# Patient Record
Sex: FEMALE | Race: ASIAN | NOT HISPANIC OR LATINO | Employment: FULL TIME
[De-identification: names, ages, dates, MRNs, and addresses within clinical notes are randomized per-mention and may not be internally consistent; named-entity substitution may affect disease eponyms.]

---

## 2017-07-31 ENCOUNTER — RECORDS - HEALTHEAST (OUTPATIENT)
Dept: ADMINISTRATIVE | Facility: OTHER | Age: 42
End: 2017-07-31

## 2017-10-14 ENCOUNTER — RECORDS - HEALTHEAST (OUTPATIENT)
Dept: ADMINISTRATIVE | Facility: OTHER | Age: 42
End: 2017-10-14

## 2017-11-13 ENCOUNTER — RECORDS - HEALTHEAST (OUTPATIENT)
Dept: ADMINISTRATIVE | Facility: OTHER | Age: 42
End: 2017-11-13

## 2018-10-16 ENCOUNTER — OFFICE VISIT - HEALTHEAST (OUTPATIENT)
Dept: FAMILY MEDICINE | Facility: CLINIC | Age: 43
End: 2018-10-16

## 2018-10-16 ENCOUNTER — COMMUNICATION - HEALTHEAST (OUTPATIENT)
Dept: TELEHEALTH | Facility: CLINIC | Age: 43
End: 2018-10-16

## 2018-10-16 DIAGNOSIS — D50.8 OTHER IRON DEFICIENCY ANEMIA: ICD-10-CM

## 2018-10-16 DIAGNOSIS — R53.82 CHRONIC FATIGUE: ICD-10-CM

## 2018-10-17 ENCOUNTER — COMMUNICATION - HEALTHEAST (OUTPATIENT)
Dept: FAMILY MEDICINE | Facility: CLINIC | Age: 43
End: 2018-10-17

## 2018-10-17 LAB
BASOPHILS # BLD AUTO: 0.1 THOU/UL (ref 0–0.2)
BASOPHILS NFR BLD AUTO: 2 % (ref 0–2)
EOSINOPHIL COUNT (ABSOLUTE): 0.1 THOU/UL (ref 0–0.4)
EOSINOPHIL NFR BLD AUTO: 3 % (ref 0–6)
ERYTHROCYTE [DISTWIDTH] IN BLOOD BY AUTOMATED COUNT: 22.1 % (ref 11–14.5)
FERRITIN SERPL-MCNC: <2 NG/ML (ref 10–130)
HCT VFR BLD AUTO: 23.1 % (ref 35–47)
HGB BLD-MCNC: 5.5 G/DL (ref 12–16)
IGA SERPL-MCNC: 196 MG/DL (ref 65–400)
IRON SATN MFR SERPL: 2 % (ref 20–50)
IRON SERPL-MCNC: 11 UG/DL (ref 42–175)
LYMPHOCYTES # BLD AUTO: 2.2 THOU/UL (ref 0.8–4.4)
LYMPHOCYTES NFR BLD AUTO: 48 % (ref 20–40)
MCH RBC QN AUTO: 13.8 PG (ref 27–34)
MCHC RBC AUTO-ENTMCNC: 23.8 G/DL (ref 32–36)
MCV RBC AUTO: 58 FL (ref 80–100)
MONOCYTES # BLD AUTO: 0.3 THOU/UL (ref 0–0.9)
MONOCYTES NFR BLD AUTO: 6 % (ref 2–10)
OVALOCYTES: ABNORMAL
PLAT MORPH BLD: NORMAL
PLATELET # BLD AUTO: 291 THOU/UL (ref 140–440)
PMV BLD AUTO: ABNORMAL FL (ref 8.5–12.5)
POLYCHROMASIA BLD QL SMEAR: ABNORMAL
RBC # BLD AUTO: 4 MILL/UL (ref 3.8–5.4)
TEAR DROP: ABNORMAL
TIBC SERPL-MCNC: 493 UG/DL (ref 313–563)
TOTAL NEUTROPHILS-ABS(DIFF): 1.9 THOU/UL (ref 2–7.7)
TOTAL NEUTROPHILS-REL(DIFF): 41 % (ref 50–70)
TRANSFERRIN SERPL-MCNC: 394 MG/DL (ref 212–360)
TSH SERPL DL<=0.005 MIU/L-ACNC: 2.26 UIU/ML (ref 0.3–5)
WBC: 4.6 THOU/UL (ref 4–11)

## 2018-10-18 LAB
TTG IGA SER-ACNC: 0.4 U/ML
TTG IGG SER-ACNC: 0.8 U/ML

## 2018-10-19 ENCOUNTER — RECORDS - HEALTHEAST (OUTPATIENT)
Dept: ADMINISTRATIVE | Facility: OTHER | Age: 43
End: 2018-10-19

## 2018-10-19 ENCOUNTER — COMMUNICATION - HEALTHEAST (OUTPATIENT)
Dept: FAMILY MEDICINE | Facility: CLINIC | Age: 43
End: 2018-10-19

## 2018-10-19 ENCOUNTER — AMBULATORY - HEALTHEAST (OUTPATIENT)
Dept: FAMILY MEDICINE | Facility: CLINIC | Age: 43
End: 2018-10-19

## 2018-10-19 DIAGNOSIS — D50.9 ANEMIA, IRON DEFICIENCY: ICD-10-CM

## 2018-10-20 ENCOUNTER — AMBULATORY - HEALTHEAST (OUTPATIENT)
Dept: FAMILY MEDICINE | Facility: CLINIC | Age: 43
End: 2018-10-20

## 2018-10-20 DIAGNOSIS — D50.8 OTHER IRON DEFICIENCY ANEMIA: ICD-10-CM

## 2018-10-22 ENCOUNTER — AMBULATORY - HEALTHEAST (OUTPATIENT)
Dept: PHARMACY | Facility: CLINIC | Age: 43
End: 2018-10-22

## 2018-10-23 ENCOUNTER — AMBULATORY - HEALTHEAST (OUTPATIENT)
Dept: PHARMACY | Facility: CLINIC | Age: 43
End: 2018-10-23

## 2018-10-23 ENCOUNTER — COMMUNICATION - HEALTHEAST (OUTPATIENT)
Dept: ADMINISTRATIVE | Facility: HOSPITAL | Age: 43
End: 2018-10-23

## 2018-10-30 ENCOUNTER — INFUSION - HEALTHEAST (OUTPATIENT)
Dept: INFUSION THERAPY | Facility: CLINIC | Age: 43
End: 2018-10-30

## 2018-10-30 DIAGNOSIS — D50.0 IRON DEFICIENCY ANEMIA DUE TO CHRONIC BLOOD LOSS: ICD-10-CM

## 2018-10-30 DIAGNOSIS — K90.9 IRON MALABSORPTION: ICD-10-CM

## 2018-11-01 ENCOUNTER — COMMUNICATION - HEALTHEAST (OUTPATIENT)
Dept: FAMILY MEDICINE | Facility: CLINIC | Age: 43
End: 2018-11-01

## 2018-11-01 ENCOUNTER — INFUSION - HEALTHEAST (OUTPATIENT)
Dept: INFUSION THERAPY | Facility: CLINIC | Age: 43
End: 2018-11-01

## 2018-11-01 DIAGNOSIS — D50.9 ANEMIA, IRON DEFICIENCY: ICD-10-CM

## 2018-11-01 DIAGNOSIS — K90.9 IRON MALABSORPTION: ICD-10-CM

## 2018-11-01 DIAGNOSIS — D50.0 IRON DEFICIENCY ANEMIA DUE TO CHRONIC BLOOD LOSS: ICD-10-CM

## 2018-11-05 ENCOUNTER — INFUSION - HEALTHEAST (OUTPATIENT)
Dept: INFUSION THERAPY | Facility: CLINIC | Age: 43
End: 2018-11-05

## 2018-11-05 DIAGNOSIS — K90.9 IRON MALABSORPTION: ICD-10-CM

## 2018-11-05 DIAGNOSIS — D50.0 IRON DEFICIENCY ANEMIA DUE TO CHRONIC BLOOD LOSS: ICD-10-CM

## 2018-11-07 ENCOUNTER — INFUSION - HEALTHEAST (OUTPATIENT)
Dept: INFUSION THERAPY | Facility: CLINIC | Age: 43
End: 2018-11-07

## 2018-11-07 DIAGNOSIS — D50.0 IRON DEFICIENCY ANEMIA DUE TO CHRONIC BLOOD LOSS: ICD-10-CM

## 2018-11-07 DIAGNOSIS — K90.9 IRON MALABSORPTION: ICD-10-CM

## 2018-11-09 ENCOUNTER — INFUSION - HEALTHEAST (OUTPATIENT)
Dept: INFUSION THERAPY | Facility: CLINIC | Age: 43
End: 2018-11-09

## 2018-11-09 DIAGNOSIS — D50.0 IRON DEFICIENCY ANEMIA DUE TO CHRONIC BLOOD LOSS: ICD-10-CM

## 2018-11-09 DIAGNOSIS — K90.9 IRON MALABSORPTION: ICD-10-CM

## 2018-11-20 ENCOUNTER — OFFICE VISIT - HEALTHEAST (OUTPATIENT)
Dept: FAMILY MEDICINE | Facility: CLINIC | Age: 43
End: 2018-11-20

## 2018-11-20 DIAGNOSIS — D50.8 OTHER IRON DEFICIENCY ANEMIA: ICD-10-CM

## 2018-11-21 ENCOUNTER — COMMUNICATION - HEALTHEAST (OUTPATIENT)
Dept: FAMILY MEDICINE | Facility: CLINIC | Age: 43
End: 2018-11-21

## 2018-11-21 LAB
BASOPHILS # BLD AUTO: 0.1 THOU/UL (ref 0–0.2)
BASOPHILS NFR BLD AUTO: 1 % (ref 0–2)
EOSINOPHIL # BLD AUTO: 0.1 THOU/UL (ref 0–0.4)
EOSINOPHIL NFR BLD AUTO: 2 % (ref 0–6)
ERYTHROCYTE [DISTWIDTH] IN BLOOD BY AUTOMATED COUNT: ABNORMAL % (ref 11–14.5)
FERRITIN SERPL-MCNC: 61 NG/ML (ref 10–130)
HCT VFR BLD AUTO: 39.8 % (ref 35–47)
HGB BLD-MCNC: 11 G/DL (ref 12–16)
LYMPHOCYTES # BLD AUTO: 1.7 THOU/UL (ref 0.8–4.4)
LYMPHOCYTES NFR BLD AUTO: 39 % (ref 20–40)
MCH RBC QN AUTO: 22 PG (ref 27–34)
MCHC RBC AUTO-ENTMCNC: 27.6 G/DL (ref 32–36)
MCV RBC AUTO: 79 FL (ref 80–100)
MONOCYTES # BLD AUTO: 0.5 THOU/UL (ref 0–0.9)
MONOCYTES NFR BLD AUTO: 11 % (ref 2–10)
NEUTROPHILS # BLD AUTO: 2 THOU/UL (ref 2–7.7)
NEUTROPHILS NFR BLD AUTO: 47 % (ref 50–70)
OVALOCYTES: ABNORMAL
PLAT MORPH BLD: NORMAL
PLATELET # BLD AUTO: 194 THOU/UL (ref 140–440)
PMV BLD AUTO: ABNORMAL FL (ref 8.5–12.5)
RBC # BLD AUTO: 5.01 MILL/UL (ref 3.8–5.4)
SCHISTOCYTES: ABNORMAL
TEAR DROP: ABNORMAL
WBC: 4.4 THOU/UL (ref 4–11)

## 2018-11-22 LAB — 25(OH)D3 SERPL-MCNC: 29.9 NG/ML (ref 30–80)

## 2018-12-02 ENCOUNTER — COMMUNICATION - HEALTHEAST (OUTPATIENT)
Dept: FAMILY MEDICINE | Facility: CLINIC | Age: 43
End: 2018-12-02

## 2018-12-04 ENCOUNTER — COMMUNICATION - HEALTHEAST (OUTPATIENT)
Dept: FAMILY MEDICINE | Facility: CLINIC | Age: 43
End: 2018-12-04

## 2018-12-19 ENCOUNTER — TELEPHONE (OUTPATIENT)
Dept: OTHER | Facility: CLINIC | Age: 43
End: 2018-12-19

## 2018-12-19 NOTE — TELEPHONE ENCOUNTER
12/19/2018    Call Regarding Onboarding: p1 - other    Attempt 1    Message on voicemail     Comments: no dep      Outreach   SV

## 2018-12-31 NOTE — TELEPHONE ENCOUNTER
12/31/2018    Call Regarding Onboarding P1 Other    Attempt 2    Message on voicemail     Comments:           Outreach   AT

## 2019-01-08 NOTE — TELEPHONE ENCOUNTER
1/8/2019    Call Regarding Onboarding P1 Other    Attempt 3    Message on voicemail    Comments:       Outreach   Emely Morse

## 2019-01-09 ENCOUNTER — COMMUNICATION - HEALTHEAST (OUTPATIENT)
Dept: FAMILY MEDICINE | Facility: CLINIC | Age: 44
End: 2019-01-09

## 2019-01-11 ENCOUNTER — OFFICE VISIT - HEALTHEAST (OUTPATIENT)
Dept: FAMILY MEDICINE | Facility: CLINIC | Age: 44
End: 2019-01-11

## 2019-01-11 DIAGNOSIS — D50.9 IRON DEFICIENCY ANEMIA, UNSPECIFIED IRON DEFICIENCY ANEMIA TYPE: ICD-10-CM

## 2019-01-11 DIAGNOSIS — Z01.84 IMMUNITY STATUS TESTING: ICD-10-CM

## 2019-01-11 LAB
ERYTHROCYTE [DISTWIDTH] IN BLOOD BY AUTOMATED COUNT: 18.6 % (ref 11–14.5)
FERRITIN SERPL-MCNC: 5 NG/ML (ref 10–130)
HCT VFR BLD AUTO: 39.8 % (ref 35–47)
HGB BLD-MCNC: 12.1 G/DL (ref 12–16)
MCH RBC QN AUTO: 24.7 PG (ref 27–34)
MCHC RBC AUTO-ENTMCNC: 30.4 G/DL (ref 32–36)
MCV RBC AUTO: 81 FL (ref 80–100)
MEV IGG SER IA-ACNC: POSITIVE
MUV IGG SER QL IA: POSITIVE
PLATELET # BLD AUTO: 189 THOU/UL (ref 140–440)
RBC # BLD AUTO: 4.89 MILL/UL (ref 3.8–5.4)
RUBV IGG SERPL QL IA: POSITIVE
WBC: 4.4 THOU/UL (ref 4–11)

## 2019-01-14 LAB — VZV IGG SER QL IA: POSITIVE

## 2019-01-15 ENCOUNTER — COMMUNICATION - HEALTHEAST (OUTPATIENT)
Dept: FAMILY MEDICINE | Facility: CLINIC | Age: 44
End: 2019-01-15

## 2019-01-15 DIAGNOSIS — D50.9 IRON DEFICIENCY ANEMIA, UNSPECIFIED IRON DEFICIENCY ANEMIA TYPE: ICD-10-CM

## 2019-01-16 ENCOUNTER — COMMUNICATION - HEALTHEAST (OUTPATIENT)
Dept: FAMILY MEDICINE | Facility: CLINIC | Age: 44
End: 2019-01-16

## 2019-01-20 ENCOUNTER — COMMUNICATION - HEALTHEAST (OUTPATIENT)
Dept: FAMILY MEDICINE | Facility: CLINIC | Age: 44
End: 2019-01-20

## 2019-01-21 ENCOUNTER — RECORDS - HEALTHEAST (OUTPATIENT)
Dept: LAB | Facility: CLINIC | Age: 44
End: 2019-01-21

## 2019-01-23 LAB
GAMMA INTERFERON BACKGROUND BLD IA-ACNC: 0.06 IU/ML
M TB IFN-G BLD-IMP: NEGATIVE
MITOGEN IGNF BCKGRD COR BLD-ACNC: 0 IU/ML
MITOGEN IGNF BCKGRD COR BLD-ACNC: 0.02 IU/ML
QTF INTERPRETATION: NORMAL
QTF MITOGEN - NIL: >10 IU/ML

## 2019-01-30 ENCOUNTER — COMMUNICATION - HEALTHEAST (OUTPATIENT)
Dept: FAMILY MEDICINE | Facility: CLINIC | Age: 44
End: 2019-01-30

## 2019-02-20 ENCOUNTER — RECORDS - HEALTHEAST (OUTPATIENT)
Dept: ADMINISTRATIVE | Facility: OTHER | Age: 44
End: 2019-02-20

## 2019-05-13 ENCOUNTER — OFFICE VISIT - HEALTHEAST (OUTPATIENT)
Dept: FAMILY MEDICINE | Facility: CLINIC | Age: 44
End: 2019-05-13

## 2019-05-13 DIAGNOSIS — J34.89 DRY NOSE: ICD-10-CM

## 2019-05-13 ASSESSMENT — MIFFLIN-ST. JEOR: SCORE: 1200.02

## 2020-06-04 ENCOUNTER — RECORDS - HEALTHEAST (OUTPATIENT)
Dept: LAB | Facility: CLINIC | Age: 45
End: 2020-06-04

## 2020-06-05 LAB
HBV SURFACE AB SERPL IA-ACNC: NEGATIVE M[IU]/ML
MEV IGG SER IA-ACNC: POSITIVE
MUV IGG SER QL IA: POSITIVE
RUBV IGG SERPL QL IA: POSITIVE
VZV IGG SER QL IA: POSITIVE

## 2020-06-06 LAB — HBV SURFACE AG SERPL QL IA: ABNORMAL

## 2020-06-10 LAB
GAMMA INTERFERON BACKGROUND BLD IA-ACNC: 0.07 IU/ML
M TB IFN-G BLD-IMP: NEGATIVE
MITOGEN IGNF BCKGRD COR BLD-ACNC: 0 IU/ML
MITOGEN IGNF BCKGRD COR BLD-ACNC: 0 IU/ML
QTF INTERPRETATION: NORMAL
QTF MITOGEN - NIL: 6.96 IU/ML

## 2020-08-24 ENCOUNTER — COMMUNICATION - HEALTHEAST (OUTPATIENT)
Dept: FAMILY MEDICINE | Facility: CLINIC | Age: 45
End: 2020-08-24

## 2020-08-24 DIAGNOSIS — D50.9 IRON DEFICIENCY ANEMIA, UNSPECIFIED IRON DEFICIENCY ANEMIA TYPE: ICD-10-CM

## 2021-05-28 NOTE — PROGRESS NOTES
"Chief Complaint   Patient presents with     Sinus Problem     Pt c/o dry nose and irritated nose, she has tried vasaline and humidifier       HPI: Patient presents today with a 1 to 2-month history of dry mildly irritated scabs in her nose.  She notes that her nose, on both sides, gets \"scabs\" in the nasal passages.  She is tried over-the-counter remedies including increasing humidify air and Vaseline to no avail.    ROS: Positive for occasional epistaxis.  Negative for fever.    SH:    reports that she has never smoked. She has never used smokeless tobacco. She reports that she does not drink alcohol or use drugs.      FH: The Patient's family history includes Diabetes in her father; Iron deficiency in her sister.     Meds:  Shana has a current medication list which includes the following prescription(s): ferrous gluconate.    O:  /64   Pulse 73   Ht 5' 1\" (1.549 m)   Wt 135 lb 1 oz (61.3 kg)   SpO2 100%   BMI 25.52 kg/m    Patient is alert conversant no acute distress  Skin pink and dry  Examination of the nares with otoscope shows mild pale lesions in the medial aspect of both nares consistent with scabs.    A/P:   1. Dry nose  She appears to have dry nasal passages.  We discussed her home remedies which I think are very appropriate.  I also prescribed Flonase 2 squirts each nares twice daily for the next week and then 1 squirt each nares twice daily.  If not improving in 2 weeks will refer to ENT.                                          "

## 2021-06-02 VITALS — WEIGHT: 128.44 LBS | BODY MASS INDEX: 25.22 KG/M2

## 2021-06-02 VITALS — WEIGHT: 129 LBS | BODY MASS INDEX: 25.33 KG/M2

## 2021-06-02 VITALS — BODY MASS INDEX: 25.76 KG/M2 | WEIGHT: 131.2 LBS

## 2021-06-02 VITALS — BODY MASS INDEX: 25.62 KG/M2 | WEIGHT: 130.5 LBS

## 2021-06-02 VITALS — WEIGHT: 132.44 LBS | BODY MASS INDEX: 26 KG/M2

## 2021-06-03 VITALS — WEIGHT: 135.06 LBS | BODY MASS INDEX: 25.5 KG/M2 | HEIGHT: 61 IN

## 2021-06-12 ENCOUNTER — HEALTH MAINTENANCE LETTER (OUTPATIENT)
Age: 46
End: 2021-06-12

## 2021-06-18 NOTE — LETTER
Letter by Steve Bains MD at      Author: Steve Bains MD Service: -- Author Type: --    Filed:  Encounter Date: 1/15/2019 Status: (Other)       Shana Draper  [address]  [place] [address]             January 15, 2019         Dear Ms. Draper,    Below are the results from your recent visit:    Resulted Orders   Mumps Antibody, IgG   Result Value Ref Range    Mumps Antibody, IgG Positive     Narrative    Negative: Absence of detectable mumps virus IgG antibodies. A negative result generally indicates that the patient has not been infected and is susceptible to mumps.     Equivocal: Suggest recollection no less than one to two weeks later.    Positive: Presence of detectable mumps virus IgG antibodies. A positive result generally indicates past exposure to mumps virus or previous vaccination.   Rubeola Antibody, IgG   Result Value Ref Range    Rubeola Antibody, IgG Positive     Narrative    Negative: Absence of detectable measles virus IgG antibodies. A negative result generally indicates that the patient has not been infected and is susceptible to measles.     Equivocal: Suggest recollection no less than one to two weeks later.    Positive: Presence of detectable measles virus IgG antibodies. A positive result generally indicates exposure to measles virus or previous vaccination.   Rubella Antibody, IgG   Result Value Ref Range    Rubella Antibody, IgG Positive     Narrative    Negative: Absence of detectable rubella virus IgG antibodies. A negative result presumes that immunity has not been acquired.     Equivocal: Suggest recollection.    Positive: Considered positive for IgG antibodies to rubella virus.   Varicella Zoster Antibody, IgG   Result Value Ref Range    Varicella Zoster Antibody IgG Positive     Narrative    Assay interference due to circulating antibodies against HIV, Hepatitis A, Hepatitis B, Hepatitis C, HAMA and Rheumatoid Factor has not been evaluated.    The assay performance in  detecting antibodies to Varicella Zoster in individuals vaccinated with the FDA-licensed VZV vaccine has not been established.    Negative: Absence of detectable Varicella Zoster IgG antibodies. A negative result indicates no detectable VZV antibody, but does not rule out acute infection. It should be noted that the test usually scores negative in infected patients during the incubation period and the early stages of infection.    Equivocal: Suggest recollection.    Positive: Presence of detectable Varicella Zoster IgG antibodies. A positive result generally indicates exposure to the pathogen or administration of specific immune-globulins, but it is no indication of active infection or stage of disease.   HM2(CBC w/o Differential)   Result Value Ref Range    WBC 4.4 4.0 - 11.0 thou/uL    RBC 4.89 3.80 - 5.40 mill/uL    Hemoglobin 12.1 12.0 - 16.0 g/dL    Hematocrit 39.8 35.0 - 47.0 %    MCV 81 80 - 100 fL    MCH 24.7 (L) 27.0 - 34.0 pg    MCHC 30.4 (L) 32.0 - 36.0 g/dL    RDW 18.6 (H) 11.0 - 14.5 %    Platelets 189 140 - 440 thou/uL   Ferritin   Result Value Ref Range    Ferritin 5 (L) 10 - 130 ng/mL       Shana, Above are your test results. You have immunity to the MMR and chickenpox. I will leave a copy of those results at the  for you to pick-up.    I note that your hemoglobin is staying stable which is good but your ferritin is dropping which means that your iron stores are getting depleted again. I think it would be good to get the upper GI endoscopy done in the next month to make sure there is not a treatable cause for your low iron.  Keep taking the iron supplement. I am afraid that in a few months your iron will become depleted to the point where you will be anemic again.    Please let me know if OK to order the UGI endoscopy?    Please call with questions or contact us using [facility].    Sincerely,        Electronically signed by Steve Bains MD

## 2021-06-21 NOTE — PROGRESS NOTES
ASSESSMENT/PLAN:       1. Other iron deficiency anemia    - Vitamin D, Total (25-Hydroxy)  - HM1(CBC and Differential)  - Ferritin  - HM1 (CBC with Diff)  Message to the patient per my chart.  If her hemoglobin has responded appropriately I would suggest that she have an EGD done.  We will need to follow her iron stores and hemoglobin over the next 6 months to make sure that it stays stable and not on the decline.  She also may have a thalassemia that is contributing to her anemia.  May need to check a hemoglobin electrophoresis.  Of note the patient did have stool for occult blood checked previously and she recalls that those were negative tests.          Steve Bains MD      PROGRESS NOTE   2018    SUBJECTIVE:  Shana Draper is a 42 y.o. female  who presents for   Chief Complaint   Patient presents with     Follow-up     1 month FU on low Iron      #1 iron deficiency anemia  The patient finished her 5 infusions of iron  and her first one was .  She feels much better with more energy.  She ran out of her oral iron about 2 weeks ago.  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  Her last menstrual period was 10/8/2018 and she states is not unusual for her to skip a month.  Her menstrual periods last only about 3 days and are fairly low.  When she is on the iron therapy she notices that her stools are dark and sometimes has constipation but the ferrous gluconate seems to be better tolerated than the ferrous sulfate.  She has had a negative colonoscopy but did not have a upper endoscopy done.  The patient's weight may be up about 10 pounds.  She tries to get a good amount of iron in her diet.    Patient Active Problem List   Diagnosis     Anemia, iron deficiency     Iron malabsorption       Current Outpatient Medications   Medication Sig Dispense Refill     ferrous gluconate (FERGON) 324 MG tablet Take 1 tablet (324 mg total) by mouth 2 times a day at 6:00 am and 4:00 pm. 100 tablet 6      No current facility-administered medications for this visit.        Social History     Tobacco Use   Smoking Status Never Smoker   Smokeless Tobacco Never Used           OBJECTIVE:        No results found for this or any previous visit (from the past 240 hour(s)).    Vitals:    11/20/18 1613   BP: 122/68   Patient Position: Sitting   Cuff Size: Adult Regular   Pulse: 73   SpO2: 100%   Weight: 130 lb 8 oz (59.2 kg)     Weight: 130 lb 8 oz (59.2 kg)          Physical Exam:  GENERAL APPEARANCE: Very pleasant 42-year-old female, NAD, well hydrated, well nourished.  I note that her complexion is better today not so pale.  SKIN:  Normal skin turgor, no lesions/rashes   NEURO: no focal findings

## 2021-06-21 NOTE — PROGRESS NOTES
Pt admitted per pedis for her iron. States she feels significantly better and is less dyspneic and fatigued although still minimally. IV started and tolerated iron given ivp over 7 min. Dcd back to work upon completion. Instructions given verbalizes understanding.

## 2021-06-21 NOTE — PROGRESS NOTES
Patient ambulated into [facility] by herself. Patient is alert and oriented and VSS. Peripheral IV started in right AC. Pateint received Iron Sucrose IV push without any problems. Peripheral IV flushed with Normal Saline and discontinued. Dry 2x2 gauze and Coban dressing applied and all questions answered.

## 2021-06-21 NOTE — PROGRESS NOTES
ASSESSMENT/PLAN:       1. Other iron deficiency anemia    - HM1(CBC and Differential)  - Ferritin  - Iron and Transferrin Iron Binding Capacity  - Tissue Transglutaminase,IgA & IgG  - Immunoglobulin A  - Thyroid Stimulating Hormone (TSH)  - HM1 (CBC with Diff)    2. Chronic fatigue    - HM1(CBC and Differential)  - Ferritin  - Iron and Transferrin Iron Binding Capacity  - Tissue Transglutaminase,IgA & IgG  - Immunoglobulin A  - Thyroid Stimulating Hormone (TSH)  - HM1 (CBC with Diff)    Discussed with the patient other potential causes for iron deficiency anemia including occult blood that may be just was missed on a stool test.  Malabsorption syndromes which could be diagnosed with a upper endoscopy and biopsies of the small bowel.  I did include a screening for celiac disease although explained to her that this condition is not as common and people of  ethnicity.  Thalassemia also could be contributing to her anemia but definitely has had documented low iron studies.  She also has had normal B12 results.  I discussed with the patient that at some point it would be advisable to do an upper endoscopy with biopsies of the small intestine.        Steve Bains MD      PROGRESS NOTE   10/16/2018    SUBJECTIVE:  Shana Draper is a 42 y.o. female  who presents for   Chief Complaint   Patient presents with     Follow Up     HX of low iron- tired, lightheadedness,      #1 iron deficiency anemia  Because of the patient's insurance she is changing her care from [facility] to [facility] and in 2017 she was noted to be anemic with a hemoglobin of 5.5.  She had a ferritin level of 1 and also low iron saturation.  The patient received 4 infusions of iron as it does not seem that oral iron has any positive effect on her hemoglobin.  She currently is taking ferrous gluconate 324 mg twice a day.  She is noticed over the last couple months that she has been more tired lightheaded and is concerned that her hemoglobin is low again.  Of  note is that her hemoglobin was never checked after the iron infusions so she is assuming that it helped her hemoglobin and she did certainly feel much better.  Her workup included a hematology consultation with no other obvious causes for her iron deficiency.  She does not have diarrhea or constipation, her weight is been stable and appetite is good.  Her diet does include red meat.  Of note is that her sister also has significant iron deficiency anemia.  Her workup and August 2017 included a colonoscopy which was negative a pelvic ultrasound which was negative.  They recommended a upper endoscopy but at that time her insurance changed and she could not afford to have a upper endoscopy done.  She now does have insurance.  She is not aware of any other malabsorption problem in her family that is been documented.  She denies any dark tarry stools or bright red blood in her stools.  She did have some stool test done last year which were negative for blood.  The patient has not been on a gluten-free diet.  She does not take any other supplements or over-the-counter medications including no aspirin or nonsteroidal anti-inflammatory medications.  Her other blood work from a year ago included normal chemistry profile including liver and kidney function.    Patient Active Problem List   Diagnosis     Anemia, iron deficiency       Current Outpatient Prescriptions   Medication Sig Dispense Refill     ferrous gluconate (FERGON) 324 MG tablet Take 324 mg by mouth.       No current facility-administered medications for this visit.        History   Smoking Status     Never Smoker   Smokeless Tobacco     Never Used     Review of systems:  The patient has not had any fever chills or night sweats  No rash  Denies vomiting  No cough  The patient has some lightheadedness at times but has been able to continue working and does not complain of shortness of breath or chest pain      OBJECTIVE:        No results found for this or any  previous visit (from the past 240 hour(s)).    Vitals:    10/16/18 1628   BP: 120/72   Patient Position: Sitting   Cuff Size: Adult Regular   Pulse: 85   SpO2: 100%   Weight: 128 lb 7 oz (58.3 kg)     Weight: 128 lb 7 oz (58.3 kg)        Physical Exam:  GENERAL APPEARANCE: Very pleasant 42-year-old  female, NAD, well hydrated, well nourished  SKIN:  Normal skin turgor, no lesions/rashes   HEENT: moist mucous membranes, note that soft palate is slightly pale colored as is the conjunctiva no rhinorrhea  NECK: Normal without adenopathy or masses  CV: RRR, no M/G/R, no gallop or click  LUNGS: CTAB  ABDOMEN: S&NT, no masses or enlarged organs   EXTREMITY: no edema and full ROM of all joints  NEURO: no focal findings

## 2021-06-21 NOTE — PROGRESS NOTES
Pt admitted per pedis for her 4th dose of iron. 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 Pt given  number to contact. IV dcd upon completion and then dcd home to return fri for last dose of iron

## 2021-06-21 NOTE — PROGRESS NOTES
Patient will get 5 infusions of iron and then return for CBC, Ferritin and appt. With me to follow-up on profound anemia.

## 2021-06-22 NOTE — TELEPHONE ENCOUNTER
I do not see any immunizations needed other than influenza vaccine.  I wanted her to get a upper GI endoscopy this month and in the next month would be good to recheck CBC and ferritin.    Dr. Bains

## 2021-06-22 NOTE — TELEPHONE ENCOUNTER
"LMTCB.   Pt was scheduled for \"IMMUNIZATIONS\" with [facility] for 1/11/19 - call to pt to ask what injection/s she wants/needs?  This should have been asked when appt was scheduled. We may need to rajat orders from your PCP to be able to give injection,  depending on what injection is requested.   "

## 2021-06-23 NOTE — TELEPHONE ENCOUNTER
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 She expressed that she is needing a varicella for sure. Please advise her at the number provided if needed.  Okay to leave a detailed message?:  Yes

## 2021-06-23 NOTE — TELEPHONE ENCOUNTER
We will proceed with upper endoscopy to evaluate for a cause in the upper GI tract to explain her recurrent iron deficiency anemia.  The patient has had negative screening tests for celiac disease.  Has had a negative colonoscopy.  She has been taking oral iron supplements and note that her ferritin has dropped down to 5.  The patient's hemoglobin is still normal

## 2021-06-23 NOTE — TELEPHONE ENCOUNTER
"LMTCB on both numbers.   Pt was scheduled for \"IMMUNIZATIONS\" with [facility] for 1/11/19 - call to pt to ask what injection/s she wants/needs?  This should have been asked when appt was scheduled. We may need to rajat orders from your PCP to be able to give injection,  depending on what injection is requested.   "

## 2021-06-23 NOTE — TELEPHONE ENCOUNTER
Did she have chickenpox as a child? If it is likely that she had chickenpox or received the varicella vaccine someplace other than [place] we could do a serology blood test to see if she is immune. If immune she would not need the vaccine.    Dr. Bains

## 2021-06-23 NOTE — PROGRESS NOTES
ASSESSMENT/PLAN:       1. Immunity status testing    - Mumps Antibody, IgG  - Rubeola Antibody, IgG  - Rubella Antibody, IgG  - Varicella Zoster Antibody, IgG    2. Iron deficiency anemia, unspecified iron deficiency anemia type    - HM2(CBC w/o Differential)  - Ferritin    The patient did bring her immunization card with her today and has immunizations when she was about 7 years old and will get those entered in her record.  She was given a copy of white record we have of her immunizations.  When the test results are back on her immune status she like to pick a copy of that up at the  so she will be called to be informed when those are available.  She is agreeable to getting a my chart message with results on her CBC and ferritin.  Hopefully those values are staying stable.  The patient should continue with her iron supplement.    15 minutes spent total with the patient face-to-face with greater than 50% of that time being spent in counseling in regard to her above problems    Steve Bains MD      PROGRESS NOTE   1/11/2019    SUBJECTIVE:  Shana Draper is a 43 y.o. female  who presents for   Chief Complaint   Patient presents with     Paperwork     titters for vaccine for volunteer work      1. Immunity status testing  The patient is desiring to volunteer at the hospital and they are requiring immunization records or evidence for immunity to MMR and chickenpox.  The patient was born in [facility] and moved to the [place] when she was 6 years old.  Her vaccination records as a child were in [place] and then as a young adult some of her records were in [place].  She has had pregnancies and she did not get any additional immunizations during her pregnancies.  Her Tdap was given in 2016.  She does not recall having chickenpox as a child.  There is no record of a Varivax.  She did have her MMR x1 when she was 7 years old.    2. Iron deficiency anemia, unspecified iron deficiency anemia type  The patient has  been taking her iron supplement and has been nearly 2 months since she had a ferritin and CBC done and is agreeable to checking that again today.  The patient feels very good with good amounts of energy.  She would prefer not to go through with an EGD but is agreeable that that should be done if her ferritin and hemoglobin dropped again.  The patient did have a negative colonoscopy.    Patient Active Problem List   Diagnosis     Anemia, iron deficiency     Iron malabsorption       Current Outpatient Medications   Medication Sig Dispense Refill     ferrous gluconate (FERGON) 324 MG tablet Take 1 tablet (324 mg total) by mouth 2 times a day at 6:00 am and 4:00 pm. 100 tablet 6     No current facility-administered medications for this visit.        Social History     Tobacco Use   Smoking Status Never Smoker   Smokeless Tobacco Never Used           OBJECTIVE:        No results found for this or any previous visit (from the past 240 hour(s)).    Vitals:    01/11/19 0908   BP: 102/70   Patient Position: Sitting   Cuff Size: Adult Regular   Pulse: 75   SpO2: 100%   Weight: 132 lb 7 oz (60.1 kg)     Weight: 132 lb 7 oz (60.1 kg)          Physical Exam:  GENERAL APPEARANCE: 43-year-old female, NAD, well hydrated, well nourished

## 2021-08-16 PROBLEM — K90.9 IRON MALABSORPTION: Status: ACTIVE | Noted: 2018-10-23

## 2021-08-17 ENCOUNTER — OFFICE VISIT (OUTPATIENT)
Dept: FAMILY MEDICINE | Facility: CLINIC | Age: 46
End: 2021-08-17
Payer: COMMERCIAL

## 2021-08-17 VITALS
SYSTOLIC BLOOD PRESSURE: 112 MMHG | BODY MASS INDEX: 26.43 KG/M2 | TEMPERATURE: 98.1 F | HEIGHT: 61 IN | OXYGEN SATURATION: 99 % | DIASTOLIC BLOOD PRESSURE: 68 MMHG | WEIGHT: 140 LBS | HEART RATE: 69 BPM

## 2021-08-17 DIAGNOSIS — B18.1 CHRONIC HEPATITIS B (H): ICD-10-CM

## 2021-08-17 DIAGNOSIS — E55.9 VITAMIN D DEFICIENCY: ICD-10-CM

## 2021-08-17 DIAGNOSIS — L13.0 DERMATITIS HERPETIFORMIS: Primary | ICD-10-CM

## 2021-08-17 DIAGNOSIS — D50.0 IRON DEFICIENCY ANEMIA DUE TO CHRONIC BLOOD LOSS: ICD-10-CM

## 2021-08-17 DIAGNOSIS — K90.9 IRON MALABSORPTION: ICD-10-CM

## 2021-08-17 LAB
ALBUMIN SERPL-MCNC: 3.9 G/DL (ref 3.5–5)
ALP SERPL-CCNC: 41 U/L (ref 45–120)
ALT SERPL W P-5'-P-CCNC: 11 U/L (ref 0–45)
ANION GAP SERPL CALCULATED.3IONS-SCNC: 11 MMOL/L (ref 5–18)
AST SERPL W P-5'-P-CCNC: 20 U/L (ref 0–40)
BILIRUB SERPL-MCNC: 0.6 MG/DL (ref 0–1)
BUN SERPL-MCNC: 10 MG/DL (ref 8–22)
CALCIUM SERPL-MCNC: 9.4 MG/DL (ref 8.5–10.5)
CHLORIDE BLD-SCNC: 106 MMOL/L (ref 98–107)
CO2 SERPL-SCNC: 22 MMOL/L (ref 22–31)
CREAT SERPL-MCNC: 0.64 MG/DL (ref 0.6–1.1)
ERYTHROCYTE [DISTWIDTH] IN BLOOD BY AUTOMATED COUNT: ABNORMAL %
FERRITIN SERPL-MCNC: 16 NG/ML (ref 10–130)
FOLATE SERPL-MCNC: 12.6 NG/ML
GFR SERPL CREATININE-BSD FRML MDRD: >90 ML/MIN/1.73M2
GLUCOSE BLD-MCNC: 101 MG/DL (ref 70–125)
HCT VFR BLD AUTO: 37.3 % (ref 35–47)
HGB BLD-MCNC: 10 G/DL (ref 11.7–15.7)
IRON SERPL-MCNC: 259 UG/DL (ref 42–175)
MAGNESIUM SERPL-MCNC: 2 MG/DL (ref 1.8–2.6)
MCH RBC QN AUTO: 19.6 PG (ref 26.5–33)
MCHC RBC AUTO-ENTMCNC: 26.8 G/DL (ref 31.5–36.5)
MCV RBC AUTO: 73 FL (ref 78–100)
PLATELET # BLD AUTO: 207 10E3/UL (ref 150–450)
POTASSIUM BLD-SCNC: 3.9 MMOL/L (ref 3.5–5)
PROT SERPL-MCNC: 7.6 G/DL (ref 6–8)
RBC # BLD AUTO: 5.09 10E6/UL (ref 3.8–5.2)
SODIUM SERPL-SCNC: 139 MMOL/L (ref 136–145)
VIT B12 SERPL-MCNC: 596 PG/ML (ref 213–816)
WBC # BLD AUTO: 3.8 10E3/UL (ref 4–11)

## 2021-08-17 PROCEDURE — 80053 COMPREHEN METABOLIC PANEL: CPT | Performed by: FAMILY MEDICINE

## 2021-08-17 PROCEDURE — 83021 HEMOGLOBIN CHROMOTOGRAPHY: CPT | Performed by: FAMILY MEDICINE

## 2021-08-17 PROCEDURE — 83516 IMMUNOASSAY NONANTIBODY: CPT | Mod: 59 | Performed by: FAMILY MEDICINE

## 2021-08-17 PROCEDURE — 83020 HEMOGLOBIN ELECTROPHORESIS: CPT | Performed by: FAMILY MEDICINE

## 2021-08-17 PROCEDURE — 83735 ASSAY OF MAGNESIUM: CPT | Performed by: FAMILY MEDICINE

## 2021-08-17 PROCEDURE — 82306 VITAMIN D 25 HYDROXY: CPT | Performed by: FAMILY MEDICINE

## 2021-08-17 PROCEDURE — 83540 ASSAY OF IRON: CPT | Performed by: FAMILY MEDICINE

## 2021-08-17 PROCEDURE — 82607 VITAMIN B-12: CPT | Performed by: FAMILY MEDICINE

## 2021-08-17 PROCEDURE — 82728 ASSAY OF FERRITIN: CPT | Performed by: FAMILY MEDICINE

## 2021-08-17 PROCEDURE — 99214 OFFICE O/P EST MOD 30 MIN: CPT | Performed by: FAMILY MEDICINE

## 2021-08-17 PROCEDURE — 85027 COMPLETE CBC AUTOMATED: CPT | Performed by: FAMILY MEDICINE

## 2021-08-17 PROCEDURE — 82746 ASSAY OF FOLIC ACID SERUM: CPT | Performed by: FAMILY MEDICINE

## 2021-08-17 PROCEDURE — 36415 COLL VENOUS BLD VENIPUNCTURE: CPT | Performed by: FAMILY MEDICINE

## 2021-08-17 RX ORDER — NORETHINDRONE ACETATE AND ETHINYL ESTRADIOL AND FERROUS FUMARATE 1MG-20(21)
1 KIT ORAL DAILY
COMMUNITY
Start: 2021-07-12

## 2021-08-17 RX ORDER — DAPSONE 75 MG/G
GEL TOPICAL DAILY
Qty: 60 G | Refills: 0 | Status: SHIPPED | OUTPATIENT
Start: 2021-08-17 | End: 2021-08-17

## 2021-08-17 RX ORDER — DAPSONE 50 MG/G
GEL TOPICAL 2 TIMES DAILY
Qty: 90 G | Refills: 0 | Status: SHIPPED | OUTPATIENT
Start: 2021-08-17

## 2021-08-17 ASSESSMENT — MIFFLIN-ST. JEOR: SCORE: 1222.42

## 2021-08-17 NOTE — LETTER
August 18, 2021      Shana Draper  [address] [facility] [address] [place] [address]        Dear ,    We are writing to inform you of your test results.    Most of your test results fall within the expected range(s) or remain unchanged from previous results. Your Vit D level is low and you need to supplement daily with 2000 international unit(s) of Vit D on a daily basis.     Resulted Orders   Ferritin   Result Value Ref Range    Ferritin 16 10 - 130 ng/mL   Iron   Result Value Ref Range    Iron 259 (H) 42 - 175 ug/dL   Folate   Result Value Ref Range    Folic Acid 12.6 >=3.5 ng/mL   Magnesium   Result Value Ref Range    Magnesium 2.0 1.8 - 2.6 mg/dL   Comprehensive metabolic panel (BMP + Alb, Alk Phos, ALT, AST, Total. Bili, TP)   Result Value Ref Range    Sodium 139 136 - 145 mmol/L    Potassium 3.9 3.5 - 5.0 mmol/L    Chloride 106 98 - 107 mmol/L    Carbon Dioxide (CO2) 22 22 - 31 mmol/L    Anion Gap 11 5 - 18 mmol/L    Urea Nitrogen 10 8 - 22 mg/dL    Creatinine 0.64 0.60 - 1.10 mg/dL    Calcium 9.4 8.5 - 10.5 mg/dL    Glucose 101 70 - 125 mg/dL    Alkaline Phosphatase 41 (L) 45 - 120 U/L    AST 20 0 - 40 U/L    ALT 11 0 - 45 U/L    Protein Total 7.6 6.0 - 8.0 g/dL    Albumin 3.9 3.5 - 5.0 g/dL    Bilirubin Total 0.6 0.0 - 1.0 mg/dL    GFR Estimate >90 >60 mL/min/1.73m2      Comment:      As of July 11, 2021, eGFR is calculated by the CKD-EPI creatinine equation, without race adjustment. eGFR can be influenced by muscle mass, exercise, and diet. The reported eGFR is an estimation only and is only applicable if the renal function is stable.   Vitamin B12   Result Value Ref Range    Vitamin B12 596 213 - 816 pg/mL   Vitamin D deficiency screening   Result Value Ref Range    Vitamin D, Total (25-Hydroxy) 23 (L) 30 - 80 ug/L    Narrative    Deficiency <10.0 ug/L  Insufficiency 10.0-29.9 ug/L  Sufficiency 30.0-80.0 ug/L  Toxicity (possible) >100.0 ug/L        If you have any questions or concerns, please call the  clinic at the number listed above.       Sincerely,      Steve Bains MD

## 2021-08-17 NOTE — LETTER
August 20, 2021      Shana Draper  [address] [address]        Dear ,    We are writing to inform you of your test results.    It looks as though you are not watching your [employer] account so I sent you your lab results. Make sure you supplement daily with 2000 international units of vitamin D all year round.    Resulted Orders   Ferritin   Result Value Ref Range    Ferritin 16 10 - 130 ng/mL   Tissue transglutaminase ankita IgA and IgG   Result Value Ref Range    Tissue Transglutaminase Antibody IgA 0.4 <7.0 U/mL      Comment:      Negative- The tTG-IgA assay has limited utility for patients with decreased levels of IgA. Screening for celiac disease should include IgA testing to rule out selective IgA deficiency and to guide selection and interpretation of serological testing. tTG-IgG testing may be positive in celiac disease patients with IgA deficiency.    Tissue Transglutaminase Antibody IgG 0.8 <7.0 U/mL      Comment:      Negative   Iron   Result Value Ref Range    Iron 259 (H) 42 - 175 ug/dL   Folate   Result Value Ref Range    Folic Acid 12.6 >=3.5 ng/mL   Magnesium   Result Value Ref Range    Magnesium 2.0 1.8 - 2.6 mg/dL   Comprehensive metabolic panel (BMP + Alb, Alk Phos, ALT, AST, Total. Bili, TP)   Result Value Ref Range    Sodium 139 136 - 145 mmol/L    Potassium 3.9 3.5 - 5.0 mmol/L    Chloride 106 98 - 107 mmol/L    Carbon Dioxide (CO2) 22 22 - 31 mmol/L    Anion Gap 11 5 - 18 mmol/L    Urea Nitrogen 10 8 - 22 mg/dL    Creatinine 0.64 0.60 - 1.10 mg/dL    Calcium 9.4 8.5 - 10.5 mg/dL    Glucose 101 70 - 125 mg/dL    Alkaline Phosphatase 41 (L) 45 - 120 U/L    AST 20 0 - 40 U/L    ALT 11 0 - 45 U/L    Protein Total 7.6 6.0 - 8.0 g/dL    Albumin 3.9 3.5 - 5.0 g/dL    Bilirubin Total 0.6 0.0 - 1.0 mg/dL    GFR Estimate >90 >60 mL/min/1.73m2      Comment:      As of July 11, 2021, eGFR is calculated by the CKD-EPI creatinine equation, without race adjustment. eGFR can be  influenced by muscle mass, exercise, and diet. The reported eGFR is an estimation only and is only applicable if the renal function is stable.   Vitamin B12   Result Value Ref Range    Vitamin B12 596 213 - 816 pg/mL   Vitamin D deficiency screening   Result Value Ref Range    Vitamin D, Total (25-Hydroxy) 23 (L) 30 - 80 ug/L    Narrative    Deficiency <10.0 ug/L  Insufficiency 10.0-29.9 ug/L  Sufficiency 30.0-80.0 ug/L  Toxicity (possible) >100.0 ug/L        If you have any questions or concerns, please call the clinic at the number listed above.       Sincerely,      Steve Bains MD

## 2021-08-18 LAB — DEPRECATED CALCIDIOL+CALCIFEROL SERPL-MC: 23 UG/L (ref 30–80)

## 2021-08-19 LAB
TTG IGA SER-ACNC: 0.4 U/ML
TTG IGG SER-ACNC: 0.8 U/ML

## 2021-08-20 NOTE — PROGRESS NOTES
"SUBJECTIVE: Shana Draper is a 45 year old  female who presents today with a complaint of a rash that has been progressing and worsening over the last few days.  It started on her arm and then spread to her legs, neck and trunk.  It is extremely itchy.  She does not have any idea where it came from.  She has not had it before.  She has not been outside or in the garden.  She has not traveled or had any new products or medications.  Nobody has a similar rash in her family.  She does have a long history of iron deficiency anemia and has chronic hepatitis B.  Looking in her chart she has been considered as possibly having celiac sprue.  This puts her at risk for dermatitis herpetiformis which is extremely itchy and similar to the rash she has currently.  She did have a negative celiac sprue labs on her previous work-up, but its not obvious whether she was refraining from gluten in her diet previous to being tested.  She continues on iron supplementation without a definite diagnosis.  She had an EGD with [facility] which did not show H pylori or celiac sprue.  It is possible she could have some form of thalassemia.  She does have quite a low MCV.  Appears to be behind on her Pap screening but tells me that she has been seen by Dr. Atwood she considers her primary care doctor.    OBJECTIVE: /68   Pulse 69   Temp 98.1  F (36.7  C) (Oral)   Ht 1.549 m (5' 1\")   Wt 63.5 kg (140 lb)   SpO2 99%   Breastfeeding No   BMI 26.45 kg/m    General: Relatively well-appearing middle-aged  female in no acute distress  Heart: Regular rate and rhythm without murmur  Lungs: Clear bilaterally  Abdomen: Soft  Extremities: Warm, dry and without edema  Skin: Erythematous maculopapular rash found in patches with irregular borders.  Excoriations due to pruritic nature.    ASSESSMENT & PLAN:     Dermatitis herpetiformis  I think it could be likely that this rash is dermatitis herpetiformis.  Otherwise I do not know of any " other etiology for her rash.  I will see if topical dapsone is effective.  - dapsone 5 % topical gel  Dispense: 90 g; Refill: 0    Iron deficiency anemia due to chronic blood loss  I will do some follow-up testing just to double check and to get recent updated lab results.  - Tissue transglutaminase ankita IgA and IgG  - Ferritin  - Iron  - Folate  - Magnesium  - Vitamin B12  - Hemoglobinopathy/Thalassemia Cascade  - CBC with platelets  - Ferritin  - Tissue transglutaminase ankita IgA and IgG  - Iron  - Folate  - Magnesium  - Vitamin B12  - Hemoglobinopathy/Thalassemia Cascade    Iron malabsorption  Not sure if her anemia is actually malabsorption or chronic loss or a mixture.    Chronic hepatitis B (H)  - Comprehensive metabolic panel (BMP + Alb, Alk Phos, ALT, AST, Total. Bili, TP)  - Comprehensive metabolic panel (BMP + Alb, Alk Phos, ALT, AST, Total. Bili, TP)    Vitamin D deficiency  I we will recheck her vitamin D as she was quite low.  She is not faithful with supplementing.  - Vitamin D deficiency screening  - Vitamin D deficiency screening      I will get back to her on her lab results by my chart and only call with grossly abnormal values.  I told her to call and let me know if she is not improving with the use of the dapsone.  Alternatively we could give her a steroid as it may be helpful.  Referral to dermatology may be needed.  She can follow-up with her primary,Dr. Sofya Atwood as needed.    Patient Active Problem List   Diagnosis     Iron deficiency anemia     Chronic hepatitis B (H)     Iron malabsorption       Current Outpatient Medications   Medication     Ascorbic Acid (VITAMIN C PO)     dapsone 5 % topical gel     ferrous gluconate (FERGON) 324 (38 FE) MG tablet     JUNEL FE 1/20 1-20 MG-MCG tablet     No current facility-administered medications for this visit.

## 2021-08-23 LAB
HEMOGLOBIN A2 QUANTITATION: 2.7 % (ref 2.2–3.5)
HEMOGLOBIN ELECTROPHRESIS: NORMAL
HEMOGLOBIN F QUANTITATION: <0.8 % (ref 0–2)
PATH ICD:: NORMAL
REVIEWING PATHOLOGIST: NORMAL

## 2021-08-29 NOTE — RESULT ENCOUNTER NOTE
Shana,  Just getting back to you now that all of your tests are back that Dr. Meneses and I had ordered.  Your hemoglobin has dropped back down to 10.  Your iron levels measured by the ferritin is in the normal range but the low end of normal.  I do feel that you need to continue taking your iron supplement twice a day with ascorbic acid or vitamin C if possible.  You do have a thalassemia trait which can lower the hemoglobin somewhat.  The other testing for celiac disease and other reasons for anemia were negative.    If you have other questions or if I can help in any other way please do not hesitate to contact me.    Would be good to repeat some blood work in about 3 months.  If need be I would be happy to see you at that time as well.Dr. Bains

## 2021-10-02 ENCOUNTER — HEALTH MAINTENANCE LETTER (OUTPATIENT)
Age: 46
End: 2021-10-02

## 2021-10-05 ENCOUNTER — MYC MEDICAL ADVICE (OUTPATIENT)
Dept: FAMILY MEDICINE | Facility: CLINIC | Age: 46
End: 2021-10-05

## 2021-10-15 NOTE — TELEPHONE ENCOUNTER
Spoke with patient.  Labs were appropriate however patient not satisfied with explanation of why labs were drawn.

## 2022-01-22 ENCOUNTER — HEALTH MAINTENANCE LETTER (OUTPATIENT)
Age: 47
End: 2022-01-22

## 2022-07-09 ENCOUNTER — HEALTH MAINTENANCE LETTER (OUTPATIENT)
Age: 47
End: 2022-07-09

## 2022-09-03 ENCOUNTER — HEALTH MAINTENANCE LETTER (OUTPATIENT)
Age: 47
End: 2022-09-03

## 2023-04-29 ENCOUNTER — HEALTH MAINTENANCE LETTER (OUTPATIENT)
Age: 48
End: 2023-04-29

## 2023-07-22 ENCOUNTER — HEALTH MAINTENANCE LETTER (OUTPATIENT)
Age: 48
End: 2023-07-22

## 2024-01-23 ENCOUNTER — TRANSFERRED RECORDS (OUTPATIENT)
Dept: HEALTH INFORMATION MANAGEMENT | Facility: CLINIC | Age: 49
End: 2024-01-23
Payer: COMMERCIAL

## 2024-01-25 ENCOUNTER — MEDICAL CORRESPONDENCE (OUTPATIENT)
Dept: HEALTH INFORMATION MANAGEMENT | Facility: CLINIC | Age: 49
End: 2024-01-25
Payer: COMMERCIAL

## 2024-01-25 DIAGNOSIS — D64.9 ANEMIA, UNSPECIFIED TYPE: ICD-10-CM

## 2024-01-25 DIAGNOSIS — D64.9 ANEMIA, UNSPECIFIED: Primary | ICD-10-CM

## 2024-01-25 RX ORDER — METHYLPREDNISOLONE SODIUM SUCCINATE 125 MG/2ML
125 INJECTION, POWDER, LYOPHILIZED, FOR SOLUTION INTRAMUSCULAR; INTRAVENOUS
Status: CANCELLED
Start: 2024-01-26

## 2024-01-25 RX ORDER — EPINEPHRINE 1 MG/ML
0.3 INJECTION, SOLUTION, CONCENTRATE INTRAVENOUS EVERY 5 MIN PRN
Status: CANCELLED | OUTPATIENT
Start: 2024-01-26

## 2024-01-25 RX ORDER — DIPHENHYDRAMINE HYDROCHLORIDE 50 MG/ML
50 INJECTION INTRAMUSCULAR; INTRAVENOUS
Status: CANCELLED
Start: 2024-01-26

## 2024-01-25 RX ORDER — HEPARIN SODIUM (PORCINE) LOCK FLUSH IV SOLN 100 UNIT/ML 100 UNIT/ML
5 SOLUTION INTRAVENOUS
Status: CANCELLED | OUTPATIENT
Start: 2024-01-26

## 2024-01-25 RX ORDER — MEPERIDINE HYDROCHLORIDE 25 MG/ML
25 INJECTION INTRAMUSCULAR; INTRAVENOUS; SUBCUTANEOUS EVERY 30 MIN PRN
Status: CANCELLED | OUTPATIENT
Start: 2024-01-26

## 2024-01-25 RX ORDER — ALBUTEROL SULFATE 90 UG/1
1-2 AEROSOL, METERED RESPIRATORY (INHALATION)
Status: CANCELLED
Start: 2024-01-26

## 2024-01-25 RX ORDER — ALBUTEROL SULFATE 0.83 MG/ML
2.5 SOLUTION RESPIRATORY (INHALATION)
Status: CANCELLED | OUTPATIENT
Start: 2024-01-26

## 2024-01-25 RX ORDER — HEPARIN SODIUM,PORCINE 10 UNIT/ML
5-20 VIAL (ML) INTRAVENOUS DAILY PRN
Status: CANCELLED | OUTPATIENT
Start: 2024-01-26

## 2024-02-01 ENCOUNTER — TELEPHONE (OUTPATIENT)
Dept: INFUSION THERAPY | Facility: HOSPITAL | Age: 49
End: 2024-02-01
Payer: COMMERCIAL

## 2024-02-06 ENCOUNTER — INFUSION THERAPY VISIT (OUTPATIENT)
Dept: INFUSION THERAPY | Facility: HOSPITAL | Age: 49
End: 2024-02-06
Payer: COMMERCIAL

## 2024-02-06 VITALS
RESPIRATION RATE: 18 BRPM | OXYGEN SATURATION: 100 % | DIASTOLIC BLOOD PRESSURE: 76 MMHG | SYSTOLIC BLOOD PRESSURE: 117 MMHG | TEMPERATURE: 98.3 F | HEART RATE: 75 BPM

## 2024-02-06 DIAGNOSIS — D64.9 ANEMIA, UNSPECIFIED TYPE: Primary | ICD-10-CM

## 2024-02-06 PROCEDURE — 96365 THER/PROPH/DIAG IV INF INIT: CPT

## 2024-02-06 PROCEDURE — 96366 THER/PROPH/DIAG IV INF ADDON: CPT

## 2024-02-06 PROCEDURE — 250N000011 HC RX IP 250 OP 636: Performed by: OBSTETRICS & GYNECOLOGY

## 2024-02-06 PROCEDURE — 258N000003 HC RX IP 258 OP 636: Performed by: OBSTETRICS & GYNECOLOGY

## 2024-02-06 RX ORDER — ALBUTEROL SULFATE 90 UG/1
1-2 AEROSOL, METERED RESPIRATORY (INHALATION)
Status: DISCONTINUED | OUTPATIENT
Start: 2024-02-06 | End: 2024-02-06 | Stop reason: HOSPADM

## 2024-02-06 RX ORDER — MEPERIDINE HYDROCHLORIDE 50 MG/ML
25 INJECTION INTRAMUSCULAR; INTRAVENOUS; SUBCUTANEOUS EVERY 30 MIN PRN
Status: CANCELLED | OUTPATIENT
Start: 2024-02-08

## 2024-02-06 RX ORDER — ALBUTEROL SULFATE 90 UG/1
1-2 AEROSOL, METERED RESPIRATORY (INHALATION)
Status: CANCELLED
Start: 2024-02-08

## 2024-02-06 RX ORDER — ALBUTEROL SULFATE 0.83 MG/ML
2.5 SOLUTION RESPIRATORY (INHALATION)
Status: CANCELLED | OUTPATIENT
Start: 2024-02-08

## 2024-02-06 RX ORDER — HEPARIN SODIUM (PORCINE) LOCK FLUSH IV SOLN 100 UNIT/ML 100 UNIT/ML
5 SOLUTION INTRAVENOUS
Status: CANCELLED | OUTPATIENT
Start: 2024-02-08

## 2024-02-06 RX ORDER — EPINEPHRINE 1 MG/ML
0.3 INJECTION, SOLUTION INTRAMUSCULAR; SUBCUTANEOUS EVERY 5 MIN PRN
Status: CANCELLED | OUTPATIENT
Start: 2024-02-08

## 2024-02-06 RX ORDER — MEPERIDINE HYDROCHLORIDE 50 MG/ML
25 INJECTION INTRAMUSCULAR; INTRAVENOUS; SUBCUTANEOUS EVERY 30 MIN PRN
Status: DISCONTINUED | OUTPATIENT
Start: 2024-02-06 | End: 2024-02-06 | Stop reason: HOSPADM

## 2024-02-06 RX ORDER — METHYLPREDNISOLONE SODIUM SUCCINATE 125 MG/2ML
125 INJECTION, POWDER, LYOPHILIZED, FOR SOLUTION INTRAMUSCULAR; INTRAVENOUS
Status: CANCELLED
Start: 2024-02-08

## 2024-02-06 RX ORDER — HEPARIN SODIUM,PORCINE 10 UNIT/ML
5-20 VIAL (ML) INTRAVENOUS DAILY PRN
Status: CANCELLED | OUTPATIENT
Start: 2024-02-08

## 2024-02-06 RX ORDER — EPINEPHRINE 1 MG/ML
0.3 INJECTION, SOLUTION INTRAMUSCULAR; SUBCUTANEOUS EVERY 5 MIN PRN
Status: DISCONTINUED | OUTPATIENT
Start: 2024-02-06 | End: 2024-02-06 | Stop reason: HOSPADM

## 2024-02-06 RX ORDER — DIPHENHYDRAMINE HYDROCHLORIDE 50 MG/ML
50 INJECTION INTRAMUSCULAR; INTRAVENOUS
Status: CANCELLED
Start: 2024-02-08

## 2024-02-06 RX ORDER — METHYLPREDNISOLONE SODIUM SUCCINATE 125 MG/2ML
125 INJECTION, POWDER, LYOPHILIZED, FOR SOLUTION INTRAMUSCULAR; INTRAVENOUS
Status: DISCONTINUED | OUTPATIENT
Start: 2024-02-06 | End: 2024-02-06 | Stop reason: HOSPADM

## 2024-02-06 RX ORDER — ALBUTEROL SULFATE 0.83 MG/ML
2.5 SOLUTION RESPIRATORY (INHALATION)
Status: DISCONTINUED | OUTPATIENT
Start: 2024-02-06 | End: 2024-02-06 | Stop reason: HOSPADM

## 2024-02-06 RX ORDER — DIPHENHYDRAMINE HYDROCHLORIDE 50 MG/ML
50 INJECTION INTRAMUSCULAR; INTRAVENOUS
Status: DISCONTINUED | OUTPATIENT
Start: 2024-02-06 | End: 2024-02-06 | Stop reason: HOSPADM

## 2024-02-06 RX ADMIN — SODIUM CHLORIDE 250 ML: 9 INJECTION, SOLUTION INTRAVENOUS at 10:06

## 2024-02-06 RX ADMIN — IRON SUCROSE 300 MG: 20 INJECTION, SOLUTION INTRAVENOUS at 10:06

## 2024-02-06 NOTE — PROGRESS NOTES
"Infusion Nursing Note:  Shana Draper presents today for Iron Sucrose 300mg #1 of 3.    Patient seen by provider today: No   present during visit today: Not Applicable.    Note: Pt arrives ambulatory to [facility]. Pt reports dyspnea going up stairs as primary symptom; \"I know I need it when it gets hard going upstairs\". Pt also reports recovering from bronchitis; pt denies dyspnea at rest, and slight cough. Pt reports having tolerated iron infusions well in the past. Discussed process of today's visit, and possible side effects.    /76 (Patient Position: Sitting)   Pulse 75   Temp 98.3  F (36.8  C)   Resp 18   SpO2 100%     Intravenous Access:  Peripheral IV placed in left AC, per pt preference.    Treatment Conditions:  Pt reports low ferritin.    Post Infusion Assessment:  Patient tolerated infusion without incident. Concomitant NS 250ml.    Site patent and intact, free from redness, edema or discomfort.  No evidence of extravasations.  Access discontinued per protocol.     Discharge Plan:   Pt given AVS with written information on Iron Sucrose.  Patient discharged in stable condition accompanied by: self.  Departure Mode: Ambulatory.      Silvia Posey RN    "

## 2024-02-13 ENCOUNTER — INFUSION THERAPY VISIT (OUTPATIENT)
Dept: INFUSION THERAPY | Facility: HOSPITAL | Age: 49
End: 2024-02-13
Payer: COMMERCIAL

## 2024-02-13 VITALS
SYSTOLIC BLOOD PRESSURE: 119 MMHG | TEMPERATURE: 98 F | OXYGEN SATURATION: 100 % | RESPIRATION RATE: 18 BRPM | HEART RATE: 76 BPM | DIASTOLIC BLOOD PRESSURE: 59 MMHG

## 2024-02-13 DIAGNOSIS — D64.9 ANEMIA, UNSPECIFIED TYPE: Primary | ICD-10-CM

## 2024-02-13 PROCEDURE — 258N000003 HC RX IP 258 OP 636: Performed by: OBSTETRICS & GYNECOLOGY

## 2024-02-13 PROCEDURE — 96366 THER/PROPH/DIAG IV INF ADDON: CPT

## 2024-02-13 PROCEDURE — 250N000011 HC RX IP 250 OP 636: Performed by: OBSTETRICS & GYNECOLOGY

## 2024-02-13 PROCEDURE — 96365 THER/PROPH/DIAG IV INF INIT: CPT

## 2024-02-13 RX ORDER — EPINEPHRINE 1 MG/ML
0.3 INJECTION, SOLUTION INTRAMUSCULAR; SUBCUTANEOUS EVERY 5 MIN PRN
Status: DISCONTINUED | OUTPATIENT
Start: 2024-02-13 | End: 2024-02-13 | Stop reason: HOSPADM

## 2024-02-13 RX ORDER — EPINEPHRINE 1 MG/ML
0.3 INJECTION, SOLUTION INTRAMUSCULAR; SUBCUTANEOUS EVERY 5 MIN PRN
Status: CANCELLED | OUTPATIENT
Start: 2024-02-14

## 2024-02-13 RX ORDER — HEPARIN SODIUM (PORCINE) LOCK FLUSH IV SOLN 100 UNIT/ML 100 UNIT/ML
5 SOLUTION INTRAVENOUS
Status: DISCONTINUED | OUTPATIENT
Start: 2024-02-13 | End: 2024-02-13 | Stop reason: HOSPADM

## 2024-02-13 RX ORDER — HEPARIN SODIUM (PORCINE) LOCK FLUSH IV SOLN 100 UNIT/ML 100 UNIT/ML
5 SOLUTION INTRAVENOUS
Status: CANCELLED | OUTPATIENT
Start: 2024-02-14

## 2024-02-13 RX ORDER — HEPARIN SODIUM,PORCINE 10 UNIT/ML
5-20 VIAL (ML) INTRAVENOUS DAILY PRN
Status: DISCONTINUED | OUTPATIENT
Start: 2024-02-13 | End: 2024-02-13 | Stop reason: HOSPADM

## 2024-02-13 RX ORDER — DIPHENHYDRAMINE HYDROCHLORIDE 50 MG/ML
50 INJECTION INTRAMUSCULAR; INTRAVENOUS
Status: DISCONTINUED | OUTPATIENT
Start: 2024-02-13 | End: 2024-02-13 | Stop reason: HOSPADM

## 2024-02-13 RX ORDER — ALBUTEROL SULFATE 90 UG/1
1-2 AEROSOL, METERED RESPIRATORY (INHALATION)
Status: DISCONTINUED | OUTPATIENT
Start: 2024-02-13 | End: 2024-02-13 | Stop reason: HOSPADM

## 2024-02-13 RX ORDER — MEPERIDINE HYDROCHLORIDE 50 MG/ML
25 INJECTION INTRAMUSCULAR; INTRAVENOUS; SUBCUTANEOUS EVERY 30 MIN PRN
Status: DISCONTINUED | OUTPATIENT
Start: 2024-02-13 | End: 2024-02-13 | Stop reason: HOSPADM

## 2024-02-13 RX ORDER — HEPARIN SODIUM,PORCINE 10 UNIT/ML
5-20 VIAL (ML) INTRAVENOUS DAILY PRN
Status: CANCELLED | OUTPATIENT
Start: 2024-02-14

## 2024-02-13 RX ORDER — ALBUTEROL SULFATE 0.83 MG/ML
2.5 SOLUTION RESPIRATORY (INHALATION)
Status: CANCELLED | OUTPATIENT
Start: 2024-02-14

## 2024-02-13 RX ORDER — METHYLPREDNISOLONE SODIUM SUCCINATE 125 MG/2ML
125 INJECTION, POWDER, LYOPHILIZED, FOR SOLUTION INTRAMUSCULAR; INTRAVENOUS
Status: DISCONTINUED | OUTPATIENT
Start: 2024-02-13 | End: 2024-02-13 | Stop reason: HOSPADM

## 2024-02-13 RX ORDER — DIPHENHYDRAMINE HYDROCHLORIDE 50 MG/ML
50 INJECTION INTRAMUSCULAR; INTRAVENOUS
Status: CANCELLED
Start: 2024-02-14

## 2024-02-13 RX ORDER — METHYLPREDNISOLONE SODIUM SUCCINATE 125 MG/2ML
125 INJECTION, POWDER, LYOPHILIZED, FOR SOLUTION INTRAMUSCULAR; INTRAVENOUS
Status: CANCELLED
Start: 2024-02-14

## 2024-02-13 RX ORDER — MEPERIDINE HYDROCHLORIDE 50 MG/ML
25 INJECTION INTRAMUSCULAR; INTRAVENOUS; SUBCUTANEOUS EVERY 30 MIN PRN
Status: CANCELLED | OUTPATIENT
Start: 2024-02-14

## 2024-02-13 RX ORDER — ALBUTEROL SULFATE 90 UG/1
1-2 AEROSOL, METERED RESPIRATORY (INHALATION)
Status: CANCELLED
Start: 2024-02-14

## 2024-02-13 RX ORDER — ALBUTEROL SULFATE 0.83 MG/ML
2.5 SOLUTION RESPIRATORY (INHALATION)
Status: DISCONTINUED | OUTPATIENT
Start: 2024-02-13 | End: 2024-02-13 | Stop reason: HOSPADM

## 2024-02-13 RX ADMIN — SODIUM CHLORIDE 250 ML: 9 INJECTION, SOLUTION INTRAVENOUS at 14:15

## 2024-02-13 RX ADMIN — IRON SUCROSE 300 MG: 20 INJECTION, SOLUTION INTRAVENOUS at 14:15

## 2024-02-13 NOTE — PROGRESS NOTES
Infusion Nursing Note:  Shana Draper presents today for venofer dose 2 of 3.    Patient seen by provider today: No   present during visit today: Not Applicable.    Note: /59 (Patient Position: Sitting)   Pulse 76   Temp 98  F (36.7  C)   Resp 18   SpO2 100% .  Venofer infused.     Intravenous Access:  Peripheral IV placed.    Treatment Conditions:  Not Applicable.    Post Infusion Assessment:  Patient tolerated infusion without incident.  Blood return noted pre and post infusion.  Site patent and intact, free from redness, edema or discomfort.  No evidence of extravasations.  Access discontinued per protocol.       Discharge Plan:   Discharge instructions reviewed with: Patient.  Patient and/or family verbalized understanding of discharge instructions and all questions answered.  Patient discharged in stable condition accompanied by: self.  Departure Mode: Ambulatory.      Jaida Faith RN

## 2024-02-20 ENCOUNTER — INFUSION THERAPY VISIT (OUTPATIENT)
Dept: INFUSION THERAPY | Facility: HOSPITAL | Age: 49
End: 2024-02-20
Payer: COMMERCIAL

## 2024-02-20 VITALS
SYSTOLIC BLOOD PRESSURE: 110 MMHG | HEART RATE: 75 BPM | OXYGEN SATURATION: 100 % | TEMPERATURE: 98 F | DIASTOLIC BLOOD PRESSURE: 70 MMHG

## 2024-02-20 DIAGNOSIS — D64.9 ANEMIA, UNSPECIFIED TYPE: Primary | ICD-10-CM

## 2024-02-20 PROCEDURE — 96365 THER/PROPH/DIAG IV INF INIT: CPT

## 2024-02-20 PROCEDURE — 250N000011 HC RX IP 250 OP 636: Performed by: OBSTETRICS & GYNECOLOGY

## 2024-02-20 PROCEDURE — 258N000003 HC RX IP 258 OP 636: Performed by: OBSTETRICS & GYNECOLOGY

## 2024-02-20 RX ORDER — DIPHENHYDRAMINE HYDROCHLORIDE 50 MG/ML
50 INJECTION INTRAMUSCULAR; INTRAVENOUS
Start: 2024-02-21

## 2024-02-20 RX ORDER — METHYLPREDNISOLONE SODIUM SUCCINATE 125 MG/2ML
125 INJECTION, POWDER, LYOPHILIZED, FOR SOLUTION INTRAMUSCULAR; INTRAVENOUS
Start: 2024-02-21

## 2024-02-20 RX ORDER — HEPARIN SODIUM,PORCINE 10 UNIT/ML
5-20 VIAL (ML) INTRAVENOUS DAILY PRN
OUTPATIENT
Start: 2024-02-21

## 2024-02-20 RX ORDER — ALBUTEROL SULFATE 0.83 MG/ML
2.5 SOLUTION RESPIRATORY (INHALATION)
OUTPATIENT
Start: 2024-02-21

## 2024-02-20 RX ORDER — MEPERIDINE HYDROCHLORIDE 50 MG/ML
25 INJECTION INTRAMUSCULAR; INTRAVENOUS; SUBCUTANEOUS EVERY 30 MIN PRN
OUTPATIENT
Start: 2024-02-21

## 2024-02-20 RX ORDER — EPINEPHRINE 1 MG/ML
0.3 INJECTION, SOLUTION INTRAMUSCULAR; SUBCUTANEOUS EVERY 5 MIN PRN
OUTPATIENT
Start: 2024-02-21

## 2024-02-20 RX ORDER — ALBUTEROL SULFATE 90 UG/1
1-2 AEROSOL, METERED RESPIRATORY (INHALATION)
Start: 2024-02-21

## 2024-02-20 RX ORDER — HEPARIN SODIUM (PORCINE) LOCK FLUSH IV SOLN 100 UNIT/ML 100 UNIT/ML
5 SOLUTION INTRAVENOUS
OUTPATIENT
Start: 2024-02-21

## 2024-02-20 RX ADMIN — SODIUM CHLORIDE 250 ML: 9 INJECTION, SOLUTION INTRAVENOUS at 13:52

## 2024-02-20 RX ADMIN — IRON SUCROSE 300 MG: 20 INJECTION, SOLUTION INTRAVENOUS at 13:51

## 2024-02-20 NOTE — PROGRESS NOTES
Infusion Nursing Note:  Shana Draper presents today for Venofer.    Patient seen by provider today: No   present during visit today: Not Applicable.    Note: N/A.      Intravenous Access:  Peripheral IV placed.    Treatment Conditions:  Not Applicable.      Post Infusion Assessment:  Patient tolerated infusion without incident.       Discharge Plan:   Patient discharged in stable condition accompanied by: self.      LUL BAXTER RN

## 2024-09-14 ENCOUNTER — HEALTH MAINTENANCE LETTER (OUTPATIENT)
Age: 49
End: 2024-09-14

## 2025-05-11 ENCOUNTER — HEALTH MAINTENANCE LETTER (OUTPATIENT)
Age: 50
End: 2025-05-11